# Patient Record
Sex: FEMALE | URBAN - METROPOLITAN AREA
[De-identification: names, ages, dates, MRNs, and addresses within clinical notes are randomized per-mention and may not be internally consistent; named-entity substitution may affect disease eponyms.]

---

## 2023-10-10 ENCOUNTER — OFFICE VISIT (OUTPATIENT)
Dept: URBAN - METROPOLITAN AREA CLINIC 72 | Facility: CLINIC | Age: 73
End: 2023-10-10

## 2023-10-12 ENCOUNTER — DASHBOARD ENCOUNTERS (OUTPATIENT)
Age: 73
End: 2023-10-12

## 2023-10-12 ENCOUNTER — OFFICE VISIT (OUTPATIENT)
Dept: URBAN - METROPOLITAN AREA CLINIC 72 | Facility: CLINIC | Age: 73
End: 2023-10-12
Payer: COMMERCIAL

## 2023-10-12 VITALS
BODY MASS INDEX: 31.69 KG/M2 | WEIGHT: 172.2 LBS | TEMPERATURE: 96.9 F | SYSTOLIC BLOOD PRESSURE: 111 MMHG | DIASTOLIC BLOOD PRESSURE: 68 MMHG | HEART RATE: 62 BPM | HEIGHT: 62 IN

## 2023-10-12 DIAGNOSIS — K21.00 ALKALINE REFLUX ESOPHAGITIS: ICD-10-CM

## 2023-10-12 DIAGNOSIS — K59.01 CONSTIPATION: ICD-10-CM

## 2023-10-12 DIAGNOSIS — K44.9 HIATAL HERNIA: ICD-10-CM

## 2023-10-12 DIAGNOSIS — R11.0 NAUSEA: ICD-10-CM

## 2023-10-12 PROBLEM — 14760008: Status: ACTIVE | Noted: 2023-10-12

## 2023-10-12 PROBLEM — 266433003: Status: ACTIVE | Noted: 2023-10-12

## 2023-10-12 PROCEDURE — 99204 OFFICE O/P NEW MOD 45 MIN: CPT | Performed by: NURSE PRACTITIONER

## 2023-10-12 RX ORDER — SERTRALINE 50 MG/1
1 TABLET TABLET, FILM COATED ORAL ONCE A DAY
Status: ACTIVE | COMMUNITY

## 2023-10-12 RX ORDER — SUCRALFATE 1 G/1
1 TABLET ON AN EMPTY STOMACH TABLET ORAL
Qty: 100 | Refills: 3 | OUTPATIENT
Start: 2023-10-12 | End: 2024-02-08

## 2023-10-12 RX ORDER — PANTOPRAZOLE SODIUM 40 MG/1
1 TABLET TABLET, DELAYED RELEASE ORAL ONCE A DAY
Status: ACTIVE | COMMUNITY

## 2023-10-12 NOTE — HPI-OTHER HISTORIES
Labs: -3/27/2023. Lipid panel: Total cholesterol 200, . CBC: Normal with Hgb 12.4 and platelet 380. Vitamin B12 high at 1233. CMP normal. Transferrin normal, folate normal. Hemoglobin A1c normal at 5.3 with normal vitamin B1 and B6.

## 2023-10-12 NOTE — HPI-TODAY'S VISIT:
73-year-old to the clinic here to establish a new gastroenterologist.  PMH: Had a gastric sleeve years ago and coverted to gastric bypass 1.5 years ago. s/p emergency cholecystectomy.  Father was in his 70's had colon and pancreatic cancer.    She reports she had a  CT scan and upper endoscopy 3 months ago and was told she had another hiatal hernia and reflux and is to continue her PPI. She continues to have nausea and vomiting. No dysphagia.  Gets epigastric pain that will pass if she vomits. Occurs every other day.  She is under a lot of stress, her  has a lot going on and is in a rehab center in [place]. She is moving him down in 3 weeks.  Takes miralax daily to have a BM daily. No melena or hematochezia.  Weight is stable.  Max weight was 252 lbs prior to her sleeve.   Last colonoscopy was 3 years ago. No polyps.

## 2024-01-17 ENCOUNTER — OFFICE VISIT (OUTPATIENT)
Dept: URBAN - METROPOLITAN AREA CLINIC 72 | Facility: CLINIC | Age: 74
End: 2024-01-17

## 2024-01-17 RX ORDER — SUCRALFATE 1 G/1
1 TABLET ON AN EMPTY STOMACH TABLET ORAL
Qty: 100 | Refills: 3 | Status: ACTIVE | COMMUNITY
Start: 2023-10-12 | End: 2024-02-08

## 2024-01-17 RX ORDER — PANTOPRAZOLE SODIUM 40 MG/1
1 TABLET TABLET, DELAYED RELEASE ORAL ONCE A DAY
Status: ACTIVE | COMMUNITY

## 2024-01-17 RX ORDER — SERTRALINE 50 MG/1
1 TABLET TABLET, FILM COATED ORAL ONCE A DAY
Status: ACTIVE | COMMUNITY

## 2024-01-17 NOTE — EXAM-GENERAL EXAMINATION
General--no acute distress, resting comfortably Eyes--anicteric, no pallor HENT--normocephalic, atraumatic head Neck--no lymphadenopathy, non tender Chest--non labored breathing, equal rise Abdomen--soft, non tender, non distended, no organomegaly Ext: GIUSEPPE, no obvious sores or rashes Psych: appropriate mood and affect Neuro--alert and oriented, answers appropriately

## 2024-01-17 NOTE — HPI-TODAY'S VISIT:
Mrs. John returns for follow-up.  Recall she is a 73-year-old last seen in office on 10/12/2023.  She was seen to establish care with gastroenterology.  She has a history of gastric sleeve converted to gastric bypass and is status post cholecystectomy.  She has a family history of colon and pancreatic cancer in her father in his 70s. She reported that she had a CT scan and upper endoscopy 3 months prior to seeing us in October and was told she had another hiatal hernia and reflux and to continue PPI therapy of pantoprazole 40mg daily.  She was suffering from ongoing reflux nausea and vomiting.  She also reported history of constipation. Carafate was added for epigastric discomfort and MiraLAX was encouraged to titrate to effect.  Prior records were also requested but do not appear to be in the chart.